# Patient Record
Sex: FEMALE | URBAN - METROPOLITAN AREA
[De-identification: names, ages, dates, MRNs, and addresses within clinical notes are randomized per-mention and may not be internally consistent; named-entity substitution may affect disease eponyms.]

---

## 2021-11-29 ENCOUNTER — OFFICE VISIT (OUTPATIENT)
Dept: URBAN - METROPOLITAN AREA CLINIC 89 | Facility: CLINIC | Age: 71
End: 2021-11-29
Payer: MEDICARE

## 2021-11-29 DIAGNOSIS — H02.834 DERMATOCHALASIS OF LEFT UPPER EYELID: ICD-10-CM

## 2021-11-29 DIAGNOSIS — H11.153 PINGUECULA, BILATERAL: ICD-10-CM

## 2021-11-29 DIAGNOSIS — H02.88B MEIBOMIAN GLAND DYSFNCT LEFT EYE, UPPER AND LOWER EYELIDS: ICD-10-CM

## 2021-11-29 DIAGNOSIS — H02.88A MEIBOMIAN GLAND DYSFNCT RIGHT EYE, UPPER AND LOWER EYELIDS: ICD-10-CM

## 2021-11-29 DIAGNOSIS — D31.32 BENIGN NEOPLASM OF LEFT CHOROID: ICD-10-CM

## 2021-11-29 DIAGNOSIS — H02.831 DERMATOCHALASIS OF RIGHT UPPER EYELID: ICD-10-CM

## 2021-11-29 DIAGNOSIS — E11.9 DIABETES MELLITUS TYPE 2 WITHOUT MENTION OF COMPLICATION: Primary | ICD-10-CM

## 2021-11-29 PROCEDURE — 92250 FUNDUS PHOTOGRAPHY W/I&R: CPT | Performed by: OPTOMETRIST

## 2021-11-29 PROCEDURE — 99204 OFFICE O/P NEW MOD 45 MIN: CPT | Performed by: OPTOMETRIST

## 2021-11-29 PROCEDURE — 92134 CPTRZ OPH DX IMG PST SGM RTA: CPT | Performed by: OPTOMETRIST

## 2021-11-29 ASSESSMENT — INTRAOCULAR PRESSURE
OD: 16
OS: 15

## 2021-11-29 NOTE — IMPRESSION/PLAN
Impression: Meibomian gland dysfnct right eye, upper and lower eyelids: H02.88A. Plan: Start warm compresses at least four times daily. Handout given.

## 2021-11-29 NOTE — IMPRESSION/PLAN
Impression: Benign neoplasm of left choroid: D31.32. Plan: Recommend annual examination. No treatment otherwise recommended at this time.  7 fields taken for documentation

## 2021-11-29 NOTE — IMPRESSION/PLAN
Impression: Diabetes mellitus Type 2 without mention of complication: I19.9. Plan: Patient educated regarding pathophysiology of DM and that DM is the leading cause of preventable blindness in adults. Educated patient regarding the importance of good blood sugar control and A1c monitoring. RTC 1 year else PRN.

## 2021-11-29 NOTE — IMPRESSION/PLAN
Impression: Dermatochalasis of right upper eyelid: H02.831. Plan: Patient has dermatochalasis that is not visually significant at this time. Monitor for changes.

## 2021-11-29 NOTE — IMPRESSION/PLAN
Impression: Age-related nuclear cataract, bilateral: H25.13. Plan: Patient has complaints consistent with visually significant cataracts. Will proceed with cataract surgery right eye first.  Discussed the risks, benefits, and alternatives of cataract surgery. Given that we almost never use retrobulbar anesthesia, there is typically no need to stop any anticoagulant medications when a patient gets cataract surgery with us. In most cataract surgeries performed by us we place an antibiotic and steroid at the time of surgery so most likely will not need to use any prescription post-op drops. The patient stated a full understanding and a desire to proceed with the procedure. Biometry ordered for intraocular lens selection. Advised against driving until complete. Reviewed the increased risk of retinal detachment after cataract surgery and reviewed retinal detachment and general warnings and to contact us immediately should any of those develop. Discussed toric lens candidate. Patient suffers from dizziness and nystagmus upon standing after being inclined due to previous meningioma. Will need extra caution in recovery. Plan for cataract surgery right eye first followed by left eye [CE OU; possible toric]. MOCT, glare testing and refraction performed.

## 2022-02-18 ENCOUNTER — TESTING ONLY (OUTPATIENT)
Dept: URBAN - METROPOLITAN AREA CLINIC 89 | Facility: CLINIC | Age: 72
End: 2022-02-18
Payer: MEDICARE

## 2022-02-18 DIAGNOSIS — H25.13 AGE-RELATED NUCLEAR CATARACT, BILATERAL: Primary | ICD-10-CM

## 2022-02-18 PROCEDURE — 92025 CPTRIZED CORNEAL TOPOGRAPHY: CPT | Performed by: OPTOMETRIST

## 2022-03-01 ENCOUNTER — SURGERY (OUTPATIENT)
Dept: URBAN - METROPOLITAN AREA SURGERY 56 | Facility: SURGERY | Age: 72
End: 2022-03-01
Payer: MEDICARE

## 2022-03-02 ENCOUNTER — POST-OPERATIVE VISIT (OUTPATIENT)
Dept: URBAN - METROPOLITAN AREA CLINIC 89 | Facility: CLINIC | Age: 72
End: 2022-03-02
Payer: COMMERCIAL

## 2022-03-02 DIAGNOSIS — Z48.810 ENCOUNTER FOR SURGICAL AFTERCARE FOLLOWING SURGERY ON A SENSE ORGAN: Primary | ICD-10-CM

## 2022-03-02 PROCEDURE — 99024 POSTOP FOLLOW-UP VISIT: CPT | Performed by: OPTOMETRIST

## 2022-03-02 ASSESSMENT — INTRAOCULAR PRESSURE: OD: 19

## 2022-03-02 NOTE — IMPRESSION/PLAN
Impression: S/P CE/Standard IOL OD - 1 Day. Encounter for surgical aftercare following surgery on a sense organ  Z48.810. Excellent post op course   Post operative instructions reviewed - Plan: S/P Cataract extraction right eye with placement of intraocular Dexycu and moxifloxacin intracamerally - post-op day #1 - Advised patient that likely will see floaters for 1-2 weeks. Advised no heavy lifting or strenuous activity for at least one week and no swimming for at least three weeks. Use eye shield at night for one week. Patient advised to call immediately for any problems including, but not limited to, pain, redness, increase in floaters, flashing lights, changes in peripheral vision, decreased vision, and/or any other problems or concerns. Patient stated an understanding of all the instructions. Follow-up in approximately one week / prn.

## 2022-03-09 ENCOUNTER — POST-OPERATIVE VISIT (OUTPATIENT)
Dept: URBAN - METROPOLITAN AREA CLINIC 89 | Facility: CLINIC | Age: 72
End: 2022-03-09
Payer: MEDICARE

## 2022-03-09 PROCEDURE — 99024 POSTOP FOLLOW-UP VISIT: CPT | Performed by: OPTOMETRIST

## 2022-03-09 ASSESSMENT — INTRAOCULAR PRESSURE
OD: 13
OS: 14

## 2022-03-09 NOTE — IMPRESSION/PLAN
Impression: S/P Cataract Extraction by phacoemulsification with IOL placement; ORA; LRI (Limbal Relaxing Incision) x 2 OD - 8 Days. Encounter for surgical aftercare following surgery on a sense organ  Z48.810. Excellent post op course   Post operative instructions reviewed - 1 week - s/p cataract surgery right eye with placement of intraocular Dexycu and moxifloxacin intracamerally. Healing well. Advised patient to avoid swimming for at least 2 more weeks. Advised to call immediately for any problems or concerns including, but not limited to, pain, redness, changes in peripheral vision and/or decreased vision. The patient stated an understanding of the above.  Plan: RTC as scheduled

## 2022-03-15 ENCOUNTER — SURGERY (OUTPATIENT)
Dept: URBAN - METROPOLITAN AREA SURGERY 56 | Facility: SURGERY | Age: 72
End: 2022-03-15
Payer: MEDICARE

## 2022-03-16 ENCOUNTER — POST-OPERATIVE VISIT (OUTPATIENT)
Dept: URBAN - METROPOLITAN AREA CLINIC 89 | Facility: CLINIC | Age: 72
End: 2022-03-16
Payer: MEDICARE

## 2022-03-16 PROCEDURE — 99024 POSTOP FOLLOW-UP VISIT: CPT | Performed by: OPTOMETRIST

## 2022-03-16 ASSESSMENT — INTRAOCULAR PRESSURE: OS: 17

## 2022-03-16 NOTE — IMPRESSION/PLAN
Impression: S/P CE/Toric IOL OS - 1 Day. Presence of intraocular lens  Z96.1. Excellent post op course   Post operative instructions reviewed - S/P Cataract extraction left eye with placement of intraocular Dexycu and moxifloxacin intracamerally - post-op day #1 - Advised patient that likely will see floaters for 1-2 weeks. Advised no heavy lifting or strenuous activity for at least one week and no swimming for at least three weeks. Use eye shield at night for one week. Patient advised to call immediately for any problems including, but not limited to, pain, redness, increase in floaters, flashing lights, changes in peripheral vision, decreased vision, and/or any other problems or concerns. Patient stated an understanding of all the instructions.  Plan: RTC as scheduled; AT PRN

## 2022-03-24 ENCOUNTER — POST-OPERATIVE VISIT (OUTPATIENT)
Dept: URBAN - METROPOLITAN AREA CLINIC 89 | Facility: CLINIC | Age: 72
End: 2022-03-24
Payer: MEDICARE

## 2022-03-24 DIAGNOSIS — Z96.1 PRESENCE OF INTRAOCULAR LENS: Primary | ICD-10-CM

## 2022-03-24 PROCEDURE — 99024 POSTOP FOLLOW-UP VISIT: CPT | Performed by: OPTOMETRIST

## 2022-03-24 ASSESSMENT — INTRAOCULAR PRESSURE
OS: 13
OD: 14

## 2022-03-24 NOTE — IMPRESSION/PLAN
Impression: S/P CE/Toric IOL OS - 9 Days. Presence of intraocular lens  Z96.1. Excellent post op course   Post operative instructions reviewed - Plan: 1 week - s/p cataract surgery left eye with placement of intraocular Dexycu and moxifloxacin intracamerally. Healing well. Advised patient to avoid swimming for at least 2 more weeks. Advised to call immediately for any problems or concerns including, but not limited to, pain, redness, changes in peripheral vision and/or decreased vision. The patient stated an understanding of the above.

## 2022-04-04 ENCOUNTER — OFFICE VISIT (OUTPATIENT)
Dept: URBAN - METROPOLITAN AREA CLINIC 89 | Facility: CLINIC | Age: 72
End: 2022-04-04
Payer: MEDICARE

## 2022-04-04 DIAGNOSIS — H10.45 OTHER CHRONIC ALLERGIC CONJUNCTIVITIS: Primary | ICD-10-CM

## 2022-04-04 PROCEDURE — 99024 POSTOP FOLLOW-UP VISIT: CPT | Performed by: OPTOMETRIST

## 2022-04-04 RX ORDER — LOTEPREDNOL ETABONATE 5 MG/G
0.5 % GEL OPHTHALMIC
Qty: 5 | Refills: 0 | Status: INACTIVE
Start: 2022-04-04 | End: 2022-04-04

## 2022-04-04 ASSESSMENT — INTRAOCULAR PRESSURE
OS: 14
OD: 13

## 2022-04-04 NOTE — IMPRESSION/PLAN
Impression: Other chronic allergic conjunctivitis: H10.45. Plan: Start Loteprednol tid OU.  RTC as scheduled, sooner if needed

## 2022-06-27 ENCOUNTER — OFFICE VISIT (OUTPATIENT)
Dept: URBAN - METROPOLITAN AREA CLINIC 89 | Facility: CLINIC | Age: 72
End: 2022-06-27
Payer: MEDICARE

## 2022-06-27 DIAGNOSIS — H02.889 MEIBOMIAN GLAND DYSFUNCTION OF UNSP, UNSPECIFIED EYELID: ICD-10-CM

## 2022-06-27 DIAGNOSIS — E11.9 DIABETES MELLITUS TYPE 2 WITHOUT MENTION OF COMPLICATION: Primary | ICD-10-CM

## 2022-06-27 DIAGNOSIS — D31.32 BENIGN NEOPLASM OF LEFT CHOROID: ICD-10-CM

## 2022-06-27 DIAGNOSIS — Z96.1 PRESENCE OF INTRAOCULAR LENS: ICD-10-CM

## 2022-06-27 PROCEDURE — 92014 COMPRE OPH EXAM EST PT 1/>: CPT | Performed by: OPTOMETRIST

## 2022-06-27 ASSESSMENT — INTRAOCULAR PRESSURE
OS: 16
OD: 16

## 2022-06-27 NOTE — IMPRESSION/PLAN
Impression: Benign neoplasm of left choroid: D31.32. Plan: Recommend annual examination. No treatment otherwise recommended at this time.

## 2022-06-27 NOTE — IMPRESSION/PLAN
Impression: Diabetes mellitus Type 2 without mention of complication: T02.8. Plan: Patient educated regarding pathophysiology of DM and that DM is the leading cause of preventable blindness in adults. Educated patient regarding the importance of good blood sugar control and A1c monitoring. RTC 1 year else PRN.

## 2022-06-27 NOTE — IMPRESSION/PLAN
Impression: Meibomian gland dysfunction of unsp, unspecified eyelid: H02.889. Plan: Start warm compresses at least four times daily. Handout given. Patient has dry eye due to inadequate lipid layer of tears. Patient is advised to start Systane Balance QID. Patient was educated regarding the rebound drying effect of LUCI and therefore Systane Balance is strongly recommended. Omega 3 supplement recommended.  Handout given

## 2022-06-27 NOTE — IMPRESSION/PLAN
Impression: Presence of intraocular lens: Z96.1. Plan: Recommend annual examination. No treatment otherwise recommended at this time. Discussed signs and symptoms of PCO development and to RTC for evaluation. Otherwise annual monitoring.